# Patient Record
Sex: FEMALE | Race: WHITE | Employment: OTHER | URBAN - METROPOLITAN AREA
[De-identification: names, ages, dates, MRNs, and addresses within clinical notes are randomized per-mention and may not be internally consistent; named-entity substitution may affect disease eponyms.]

---

## 2024-01-30 ENCOUNTER — OFFICE VISIT (OUTPATIENT)
Age: 84
End: 2024-01-30
Payer: MEDICARE

## 2024-01-30 VITALS
SYSTOLIC BLOOD PRESSURE: 131 MMHG | OXYGEN SATURATION: 98 % | BODY MASS INDEX: 23.55 KG/M2 | DIASTOLIC BLOOD PRESSURE: 70 MMHG | HEART RATE: 64 BPM | WEIGHT: 128 LBS | TEMPERATURE: 97.5 F | HEIGHT: 62 IN

## 2024-01-30 DIAGNOSIS — H35.30 MACULAR DEGENERATION, UNSPECIFIED LATERALITY, UNSPECIFIED TYPE: ICD-10-CM

## 2024-01-30 DIAGNOSIS — E53.8 VITAMIN B12 DEFICIENCY: ICD-10-CM

## 2024-01-30 DIAGNOSIS — Z79.890 HORMONE REPLACEMENT THERAPY (POSTMENOPAUSAL): ICD-10-CM

## 2024-01-30 DIAGNOSIS — J30.2 SEASONAL ALLERGIC RHINITIS, UNSPECIFIED TRIGGER: ICD-10-CM

## 2024-01-30 DIAGNOSIS — E55.9 VITAMIN D DEFICIENCY, UNSPECIFIED: ICD-10-CM

## 2024-01-30 DIAGNOSIS — I10 HYPERTENSION, UNSPECIFIED TYPE: Primary | ICD-10-CM

## 2024-01-30 DIAGNOSIS — G47.00 INSOMNIA, UNSPECIFIED TYPE: ICD-10-CM

## 2024-01-30 DIAGNOSIS — K21.9 GASTROESOPHAGEAL REFLUX DISEASE WITHOUT ESOPHAGITIS: ICD-10-CM

## 2024-01-30 DIAGNOSIS — F32.A ANXIETY AND DEPRESSION: ICD-10-CM

## 2024-01-30 DIAGNOSIS — F41.9 ANXIETY AND DEPRESSION: ICD-10-CM

## 2024-01-30 DIAGNOSIS — E78.2 MIXED HYPERLIPIDEMIA: ICD-10-CM

## 2024-01-30 DIAGNOSIS — M15.9 PRIMARY OSTEOARTHRITIS INVOLVING MULTIPLE JOINTS: ICD-10-CM

## 2024-01-30 DIAGNOSIS — J44.9 CHRONIC OBSTRUCTIVE PULMONARY DISEASE, UNSPECIFIED COPD TYPE (HCC): ICD-10-CM

## 2024-01-30 DIAGNOSIS — E03.9 ACQUIRED HYPOTHYROIDISM: ICD-10-CM

## 2024-01-30 DIAGNOSIS — R73.9 HYPERGLYCEMIA: ICD-10-CM

## 2024-01-30 PROBLEM — M15.0 PRIMARY OSTEOARTHRITIS INVOLVING MULTIPLE JOINTS: Status: ACTIVE | Noted: 2024-01-30

## 2024-01-30 LAB
ALBUMIN SERPL-MCNC: 3.7 G/DL (ref 3.5–5)
ALBUMIN/GLOB SERPL: 1.2 (ref 1.1–2.2)
ALP SERPL-CCNC: 63 U/L (ref 45–117)
ALT SERPL-CCNC: 17 U/L (ref 12–78)
ANION GAP SERPL CALC-SCNC: 5 MMOL/L (ref 5–15)
AST SERPL-CCNC: 21 U/L (ref 15–37)
BASOPHILS # BLD: 0.1 K/UL (ref 0–0.1)
BASOPHILS NFR BLD: 1 % (ref 0–1)
BILIRUB SERPL-MCNC: 0.5 MG/DL (ref 0.2–1)
BUN SERPL-MCNC: 21 MG/DL (ref 6–20)
BUN/CREAT SERPL: 29 (ref 12–20)
CALCIUM SERPL-MCNC: 9.1 MG/DL (ref 8.5–10.1)
CHLORIDE SERPL-SCNC: 105 MMOL/L (ref 97–108)
CHOLEST SERPL-MCNC: 153 MG/DL
CO2 SERPL-SCNC: 31 MMOL/L (ref 21–32)
CREAT SERPL-MCNC: 0.72 MG/DL (ref 0.55–1.02)
CREAT UR-MCNC: 111 MG/DL
DIFFERENTIAL METHOD BLD: ABNORMAL
EOSINOPHIL # BLD: 0.8 K/UL (ref 0–0.4)
EOSINOPHIL NFR BLD: 10 % (ref 0–7)
ERYTHROCYTE [DISTWIDTH] IN BLOOD BY AUTOMATED COUNT: 13.4 % (ref 11.5–14.5)
GLOBULIN SER CALC-MCNC: 3.1 G/DL (ref 2–4)
GLUCOSE SERPL-MCNC: 95 MG/DL (ref 65–100)
HCT VFR BLD AUTO: 39.1 % (ref 35–47)
HDLC SERPL-MCNC: 95 MG/DL
HDLC SERPL: 1.6 (ref 0–5)
HGB BLD-MCNC: 12 G/DL (ref 11.5–16)
IMM GRANULOCYTES # BLD AUTO: 0 K/UL (ref 0–0.04)
IMM GRANULOCYTES NFR BLD AUTO: 0 % (ref 0–0.5)
LDLC SERPL CALC-MCNC: 42.8 MG/DL (ref 0–100)
LYMPHOCYTES # BLD: 1.4 K/UL (ref 0.8–3.5)
LYMPHOCYTES NFR BLD: 19 % (ref 12–49)
MCH RBC QN AUTO: 29.2 PG (ref 26–34)
MCHC RBC AUTO-ENTMCNC: 30.7 G/DL (ref 30–36.5)
MCV RBC AUTO: 95.1 FL (ref 80–99)
MICROALBUMIN UR-MCNC: 0.79 MG/DL
MICROALBUMIN/CREAT UR-RTO: 7 MG/G (ref 0–30)
MONOCYTES # BLD: 0.5 K/UL (ref 0–1)
MONOCYTES NFR BLD: 7 % (ref 5–13)
NEUTS SEG # BLD: 4.5 K/UL (ref 1.8–8)
NEUTS SEG NFR BLD: 63 % (ref 32–75)
NRBC # BLD: 0 K/UL (ref 0–0.01)
NRBC BLD-RTO: 0 PER 100 WBC
PLATELET # BLD AUTO: 231 K/UL (ref 150–400)
PMV BLD AUTO: 10.5 FL (ref 8.9–12.9)
POTASSIUM SERPL-SCNC: 4.1 MMOL/L (ref 3.5–5.1)
PROT SERPL-MCNC: 6.8 G/DL (ref 6.4–8.2)
RBC # BLD AUTO: 4.11 M/UL (ref 3.8–5.2)
SODIUM SERPL-SCNC: 141 MMOL/L (ref 136–145)
TRIGL SERPL-MCNC: 76 MG/DL
VLDLC SERPL CALC-MCNC: 15.2 MG/DL
WBC # BLD AUTO: 7.2 K/UL (ref 3.6–11)

## 2024-01-30 PROCEDURE — 99204 OFFICE O/P NEW MOD 45 MIN: CPT | Performed by: INTERNAL MEDICINE

## 2024-01-30 PROCEDURE — 1123F ACP DISCUSS/DSCN MKR DOCD: CPT | Performed by: INTERNAL MEDICINE

## 2024-01-30 PROCEDURE — 3075F SYST BP GE 130 - 139MM HG: CPT | Performed by: INTERNAL MEDICINE

## 2024-01-30 PROCEDURE — 3078F DIAST BP <80 MM HG: CPT | Performed by: INTERNAL MEDICINE

## 2024-01-30 RX ORDER — BUSPIRONE HYDROCHLORIDE 30 MG/1
30 TABLET ORAL 2 TIMES DAILY
COMMUNITY

## 2024-01-30 RX ORDER — LOSARTAN POTASSIUM 100 MG/1
100 TABLET ORAL 2 TIMES DAILY
COMMUNITY

## 2024-01-30 RX ORDER — DULOXETIN HYDROCHLORIDE 60 MG/1
60 CAPSULE, DELAYED RELEASE ORAL DAILY
COMMUNITY

## 2024-01-30 RX ORDER — ATORVASTATIN CALCIUM 80 MG/1
80 TABLET, FILM COATED ORAL DAILY
COMMUNITY

## 2024-01-30 RX ORDER — OMEPRAZOLE 40 MG/1
40 CAPSULE, DELAYED RELEASE ORAL DAILY
COMMUNITY

## 2024-01-30 RX ORDER — CELECOXIB 400 MG/1
400 CAPSULE ORAL
COMMUNITY

## 2024-01-30 RX ORDER — FUROSEMIDE 20 MG/1
20 TABLET ORAL DAILY
COMMUNITY

## 2024-01-30 RX ORDER — AMLODIPINE BESYLATE 10 MG/1
10 TABLET ORAL DAILY
COMMUNITY

## 2024-01-30 RX ORDER — LEVOCETIRIZINE DIHYDROCHLORIDE 5 MG/1
5 TABLET, FILM COATED ORAL NIGHTLY
COMMUNITY

## 2024-01-30 RX ORDER — ASPIRIN 81 MG/1
81 TABLET ORAL DAILY
COMMUNITY

## 2024-01-30 RX ORDER — ZOLPIDEM TARTRATE 12.5 MG/1
12.5 TABLET, FILM COATED, EXTENDED RELEASE ORAL NIGHTLY PRN
COMMUNITY

## 2024-01-30 RX ORDER — TRAZODONE HYDROCHLORIDE 150 MG/1
150 TABLET ORAL NIGHTLY
COMMUNITY

## 2024-01-30 RX ORDER — LEVOTHYROXINE SODIUM 0.05 MG/1
50 TABLET ORAL DAILY
COMMUNITY

## 2024-01-30 RX ORDER — LABETALOL 200 MG/1
200 TABLET, FILM COATED ORAL 2 TIMES DAILY
COMMUNITY

## 2024-01-30 SDOH — ECONOMIC STABILITY: FOOD INSECURITY: WITHIN THE PAST 12 MONTHS, YOU WORRIED THAT YOUR FOOD WOULD RUN OUT BEFORE YOU GOT MONEY TO BUY MORE.: NEVER TRUE

## 2024-01-30 SDOH — ECONOMIC STABILITY: HOUSING INSECURITY
IN THE LAST 12 MONTHS, WAS THERE A TIME WHEN YOU DID NOT HAVE A STEADY PLACE TO SLEEP OR SLEPT IN A SHELTER (INCLUDING NOW)?: NO

## 2024-01-30 SDOH — ECONOMIC STABILITY: FOOD INSECURITY: WITHIN THE PAST 12 MONTHS, THE FOOD YOU BOUGHT JUST DIDN'T LAST AND YOU DIDN'T HAVE MONEY TO GET MORE.: NEVER TRUE

## 2024-01-30 SDOH — ECONOMIC STABILITY: INCOME INSECURITY: HOW HARD IS IT FOR YOU TO PAY FOR THE VERY BASICS LIKE FOOD, HOUSING, MEDICAL CARE, AND HEATING?: NOT HARD AT ALL

## 2024-01-30 ASSESSMENT — PATIENT HEALTH QUESTIONNAIRE - PHQ9
3. TROUBLE FALLING OR STAYING ASLEEP: 0
6. FEELING BAD ABOUT YOURSELF - OR THAT YOU ARE A FAILURE OR HAVE LET YOURSELF OR YOUR FAMILY DOWN: 0
8. MOVING OR SPEAKING SO SLOWLY THAT OTHER PEOPLE COULD HAVE NOTICED. OR THE OPPOSITE, BEING SO FIGETY OR RESTLESS THAT YOU HAVE BEEN MOVING AROUND A LOT MORE THAN USUAL: 1
SUM OF ALL RESPONSES TO PHQ QUESTIONS 1-9: 3
10. IF YOU CHECKED OFF ANY PROBLEMS, HOW DIFFICULT HAVE THESE PROBLEMS MADE IT FOR YOU TO DO YOUR WORK, TAKE CARE OF THINGS AT HOME, OR GET ALONG WITH OTHER PEOPLE: 0
1. LITTLE INTEREST OR PLEASURE IN DOING THINGS: 0
9. THOUGHTS THAT YOU WOULD BE BETTER OFF DEAD, OR OF HURTING YOURSELF: 0
7. TROUBLE CONCENTRATING ON THINGS, SUCH AS READING THE NEWSPAPER OR WATCHING TELEVISION: 1
5. POOR APPETITE OR OVEREATING: 0
2. FEELING DOWN, DEPRESSED OR HOPELESS: 0
4. FEELING TIRED OR HAVING LITTLE ENERGY: 1
SUM OF ALL RESPONSES TO PHQ9 QUESTIONS 1 & 2: 0

## 2024-01-30 NOTE — PROGRESS NOTES
Chief Complaint   Patient presents with    Establish Care     New patient. Just moved to [place] 7-10 days ago from [facility].     Other     Had Stroke 2 weeks ago, given TPA, went to [facility] in [facility]. Requesting referral to neurology.    History of macular degeneration, needs referral for eye specialist.    Joint Pain     C/o Multiple joint pain, worst in hands and she has visable joint deformity of hands, c/o bilateral swelling of feet. 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         1. \"Have you been to the ER, urgent care clinic since your last visit?  Hospitalized since your last visit?\"    yes    2. \"Have you seen or consulted any other health care providers outside of the [facility] since your last visit?\"      yes               1/30/2024     1:58 PM   PHQ-9    Little interest or pleasure in doing things 0   Feeling down, depressed, or hopeless 0   Trouble falling or staying asleep, or sleeping too much 0   Feeling tired or having little energy 1   Poor appetite or overeating 0   Feeling bad about yourself - or that you are a failure or have let yourself or your family down 0   Trouble concentrating on things, such as reading the newspaper or watching television 1   Moving or speaking so slowly that other people could have noticed. Or the opposite - being so fidgety or restless that you have been moving around a lot more than usual 1   Thoughts that you would be better off dead, or of hurting yourself in some way 0   PHQ-2 Score 0   PHQ-9 Total Score 3   If you checked off any problems, how difficult have these problems made it for you to do your work, take care of things at home, or get along with other people? 0           Financial Resource Strain: Low Risk  (1/30/2024)    Overall Financial Resource Strain (CARDIA)     Difficulty of Paying Living Expenses: Not hard at all      Food Insecurity: No Food Insecurity (1/30/2024)    Hunger Vital Sign     Worried About Running Out of

## 2024-01-30 NOTE — PATIENT INSTRUCTIONS
List of Mental Health Providers:    [facility]  [address]  [phone]  ---------------------------------------------  [facility]  [address]  [phone]  ---------------------------------------------  [facility]  [address]  [phone]  ---------------------------------------------  [address]  [phone]  ---------------------------------------------  [facility]  [address], [address]  [phone]    Or    [address] [place] [address] 101  [phone]  ---------------------------------------------  Brett Anderson MD  [phone]  ---------------------------------------------  [facility]  [phone]  --------------------------------------------  [facility] [place] [address], [address] [address] [phone]  [place]  --------------------------------------------  [facility]  [address], [address] [place] [address] [phone]

## 2024-01-31 LAB
25(OH)D3 SERPL-MCNC: 57.8 NG/ML (ref 30–100)
EST. AVERAGE GLUCOSE BLD GHB EST-MCNC: 94 MG/DL
FOLATE SERPL-MCNC: 13.7 NG/ML (ref 5–21)
HBA1C MFR BLD: 4.9 % (ref 4–5.6)
VIT B12 SERPL-MCNC: >2000 PG/ML (ref 193–986)

## 2024-02-01 ASSESSMENT — ENCOUNTER SYMPTOMS
CHEST TIGHTNESS: 0
COLOR CHANGE: 0
VOMITING: 0
SORE THROAT: 0
SHORTNESS OF BREATH: 0
RHINORRHEA: 0
WHEEZING: 0
NAUSEA: 0
FACIAL SWELLING: 0
COUGH: 0
ABDOMINAL PAIN: 0

## 2024-02-02 LAB — TSH SERPL DL<=0.05 MIU/L-ACNC: 2.6 UIU/ML (ref 0.45–4.5)

## 2024-02-12 ENCOUNTER — TELEPHONE (OUTPATIENT)
Age: 84
End: 2024-02-12

## 2024-02-12 DIAGNOSIS — Z86.73 HISTORY OF CVA (CEREBROVASCULAR ACCIDENT): Primary | ICD-10-CM

## 2024-02-12 NOTE — TELEPHONE ENCOUNTER
Patient called stating that she had an understanding that Dr. Mcguire would write her a referral to a urologist. Patient is hoping that this can be done as well as a call back when this has been completed.    Best call back number  [phone]

## 2024-02-14 RX ORDER — ATORVASTATIN CALCIUM 80 MG/1
80 TABLET, FILM COATED ORAL NIGHTLY
Qty: 90 TABLET | Refills: 1 | Status: SHIPPED | OUTPATIENT
Start: 2024-02-14 | End: 2024-02-15

## 2024-02-14 NOTE — TELEPHONE ENCOUNTER
Last appointment: 1/30/24 Shayla, lipid completed 1/2024  Next appointment: 3/13/24 Shayla  Previous refill encounter(s): historical provider- New patient    Requested Prescriptions     Pending Prescriptions Disp Refills    atorvastatin (LIPITOR) 80 MG tablet [Pharmacy Med Name: Atorvastatin Calcium Oral Tablet 80 MG] 90 tablet 1     Sig: TAKE ONE TABLET BY MOUTH AT BEDTIME     For Pharmacy Admin Tracking Only    Program: Medication Refill  CPA in place:    Recommendation Provided To:   Intervention Detail: New Rx: 1, reason: Patient Preference  Intervention Accepted By:   Gap Closed?:    Time Spent (min): 5

## 2024-02-15 ENCOUNTER — TELEPHONE (OUTPATIENT)
Age: 84
End: 2024-02-15

## 2024-02-15 ENCOUNTER — OFFICE VISIT (OUTPATIENT)
Age: 84
End: 2024-02-15
Payer: MEDICARE

## 2024-02-15 VITALS
BODY MASS INDEX: 24.28 KG/M2 | OXYGEN SATURATION: 98 % | RESPIRATION RATE: 17 BRPM | SYSTOLIC BLOOD PRESSURE: 110 MMHG | WEIGHT: 128.6 LBS | HEIGHT: 61 IN | HEART RATE: 64 BPM | DIASTOLIC BLOOD PRESSURE: 60 MMHG

## 2024-02-15 DIAGNOSIS — R00.2 PALPITATIONS: Primary | ICD-10-CM

## 2024-02-15 DIAGNOSIS — I48.0 PAROXYSMAL ATRIAL FIBRILLATION (HCC): ICD-10-CM

## 2024-02-15 DIAGNOSIS — I10 PRIMARY HYPERTENSION: ICD-10-CM

## 2024-02-15 DIAGNOSIS — E03.9 ACQUIRED HYPOTHYROIDISM: ICD-10-CM

## 2024-02-15 DIAGNOSIS — K21.9 GASTROESOPHAGEAL REFLUX DISEASE WITHOUT ESOPHAGITIS: ICD-10-CM

## 2024-02-15 DIAGNOSIS — I67.1 INTERNAL CAROTID ANEURYSM: ICD-10-CM

## 2024-02-15 PROCEDURE — 1123F ACP DISCUSS/DSCN MKR DOCD: CPT | Performed by: INTERNAL MEDICINE

## 2024-02-15 PROCEDURE — 3078F DIAST BP <80 MM HG: CPT | Performed by: INTERNAL MEDICINE

## 2024-02-15 PROCEDURE — 93005 ELECTROCARDIOGRAM TRACING: CPT | Performed by: INTERNAL MEDICINE

## 2024-02-15 PROCEDURE — 3074F SYST BP LT 130 MM HG: CPT | Performed by: INTERNAL MEDICINE

## 2024-02-15 PROCEDURE — 99204 OFFICE O/P NEW MOD 45 MIN: CPT | Performed by: INTERNAL MEDICINE

## 2024-02-15 PROCEDURE — 93010 ELECTROCARDIOGRAM REPORT: CPT | Performed by: INTERNAL MEDICINE

## 2024-02-15 RX ORDER — ATORVASTATIN CALCIUM 80 MG/1
80 TABLET, FILM COATED ORAL NIGHTLY
Qty: 90 TABLET | Refills: 1 | Status: SHIPPED | OUTPATIENT
Start: 2024-02-15

## 2024-02-15 RX ORDER — ATORVASTATIN CALCIUM 80 MG/1
80 TABLET, FILM COATED ORAL NIGHTLY
Qty: 90 TABLET | Refills: 1 | Status: SHIPPED | OUTPATIENT
Start: 2024-02-15 | End: 2024-02-15 | Stop reason: SDUPTHER

## 2024-02-15 RX ORDER — FUROSEMIDE 20 MG/1
20 TABLET ORAL DAILY
Qty: 90 TABLET | Refills: 0 | Status: SHIPPED | OUTPATIENT
Start: 2024-02-15 | End: 2024-02-15 | Stop reason: SDUPTHER

## 2024-02-15 RX ORDER — FUROSEMIDE 20 MG/1
20 TABLET ORAL DAILY
Qty: 90 TABLET | Refills: 0 | Status: SHIPPED | OUTPATIENT
Start: 2024-02-15 | End: 2024-05-15

## 2024-02-15 RX ORDER — MV-MIN/FA/VIT K/LUTEIN/ZEAXANT 200MCG-5MG
1 CAPSULE ORAL DAILY
Qty: 30 CAPSULE | Refills: 3 | OUTPATIENT
Start: 2024-02-15

## 2024-02-15 NOTE — TELEPHONE ENCOUNTER
Enrolled with [employer] - Ordered and being shipped to patient's home address on file.  ETA within 5-7 business days.    14 Day Holter  Received: Today  Marti Ta, Carmen Schneider :)    Would you please order a 14 day for this patient?      Thank youuu!      Marti

## 2024-02-15 NOTE — TELEPHONE ENCOUNTER
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    [phone]

## 2024-02-15 NOTE — PROGRESS NOTES
Cardiology  Clinic -   Follow up Visit   Date of Service : 2/15/2024    Name: Rajani Barboza  Patient ID: [id number]  Age: 83 y.o. 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  Need to rule out cardioembolic causes.  Plan to do a Holter monitor for 14 days to rule out atrial fibrillation.  I agree with continuing aspirin 81 mg daily  Patient also has bilateral pedal edema worsening which could be secondary to her acute on chronic diastolic heart failure and amlodipine side effect I advised to increase the dose of Lasix to 40 mg once daily for 5 days and go to 20 mg once daily thereafter  Hypertension fairly well-controlled on amlodipine 10 mg once daily along with labetalol 200 mg twice daily along with losartan 1 tablet twice a day  Advise strict salt and fluid restriction  Advised to follow-up with PCP for management of hypothyroidism and COPD  Patient also has fusiform dilatation of both cavernous segments of left internal carotid artery advised to follow-up with neurology  Hyperlipidemia fairly well-controlled on Lipitor 80 mg once daily and fish oil  Patient advised aggressive risk factor and lifestyle modification- diet and exercise counseling done   patient advised strict compliance with medication and follow-up   handouts were given to patient along with  trustworthy

## 2024-02-16 NOTE — TELEPHONE ENCOUNTER
Requesting referral for Neurology, not urology. For PMH of stroke. Please put in referral, gave her office info for Dr. Bartlett.

## 2024-02-22 ENCOUNTER — OFFICE VISIT (OUTPATIENT)
Age: 84
End: 2024-02-22
Payer: MEDICARE

## 2024-02-22 VITALS
BODY MASS INDEX: 22.45 KG/M2 | HEART RATE: 57 BPM | OXYGEN SATURATION: 99 % | SYSTOLIC BLOOD PRESSURE: 122 MMHG | WEIGHT: 122 LBS | DIASTOLIC BLOOD PRESSURE: 58 MMHG | HEIGHT: 62 IN

## 2024-02-22 DIAGNOSIS — Z86.73 REMOTE HISTORY OF STROKE: Primary | ICD-10-CM

## 2024-02-22 DIAGNOSIS — Z86.73 REMOTE HISTORY OF STROKE: ICD-10-CM

## 2024-02-22 DIAGNOSIS — I10 HYPERTENSION, UNSPECIFIED TYPE: ICD-10-CM

## 2024-02-22 DIAGNOSIS — E78.5 HYPERLIPIDEMIA, UNSPECIFIED HYPERLIPIDEMIA TYPE: ICD-10-CM

## 2024-02-22 PROCEDURE — 1123F ACP DISCUSS/DSCN MKR DOCD: CPT | Performed by: PSYCHIATRY & NEUROLOGY

## 2024-02-22 PROCEDURE — 3078F DIAST BP <80 MM HG: CPT | Performed by: PSYCHIATRY & NEUROLOGY

## 2024-02-22 PROCEDURE — 3074F SYST BP LT 130 MM HG: CPT | Performed by: PSYCHIATRY & NEUROLOGY

## 2024-02-22 PROCEDURE — 99205 OFFICE O/P NEW HI 60 MIN: CPT | Performed by: PSYCHIATRY & NEUROLOGY

## 2024-02-22 RX ORDER — CLOPIDOGREL BISULFATE 75 MG/1
75 TABLET ORAL DAILY
Qty: 60 TABLET | Refills: 0 | Status: SHIPPED | OUTPATIENT
Start: 2024-02-22 | End: 2024-02-23 | Stop reason: SDUPTHER

## 2024-02-22 RX ORDER — CLOPIDOGREL BISULFATE 75 MG/1
75 TABLET ORAL DAILY
Qty: 60 TABLET | Refills: 0 | Status: SHIPPED | OUTPATIENT
Start: 2024-02-22 | End: 2024-02-22 | Stop reason: SDUPTHER

## 2024-02-22 NOTE — PROGRESS NOTES
NEUROLOGY  NEW PATIENT EVALUATION/CONSULTATION       PATIENT NAME: Rajani Barboza    MRN: [id number]    REASON FOR CONSULTATION: Stroke    02/22/24      Previous records (physician notes, laboratory reports, and radiology reports) and imaging studies were reviewed and summarized. My recommendations will be communicated back to the patient's physician(s) via electronic medical record and/or by US mail.       HISTORY OF PRESENT ILLNESS:  Rajani Barboza is a 83 y.o. right handed female presenting for evaluation of stroke. She has relocated recently from [place] and is referred by her PCP due to h/o stroke 1/15/24 with acute L-HP, speech deficits s/p tPA. I unfortunately do not have records for review today. Pt/family reports she improved significant thereafter. Etiology is uncertain. She is undergoing cardiac monitoring currently. No known h/o Afib. She was taking ASA prior to her recent stroke. Upon discharge, statin therapy was started for HPL and ASA was continued. She admits to vascular risk factors including HTN, HPL. Other PMH of WARNER, hypothyroidism, COPD, macular degeneration, OA. She denies h/o stroke apart from events on 1/15/24. No recurrent stroke deficits since her hospitalization. She has experienced some periodic lightheadedness since discharge along with residual word finding difficulty. Denies residual focal weakness, numbness/tingling. Reports baseline gait instability, requiring a cane for assistance. She is living with her son and two grandchildren.     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

## 2024-02-23 DIAGNOSIS — Z86.73 REMOTE HISTORY OF STROKE: ICD-10-CM

## 2024-02-23 RX ORDER — CLOPIDOGREL BISULFATE 75 MG/1
75 TABLET ORAL DAILY
Qty: 60 TABLET | Refills: 0 | OUTPATIENT
Start: 2024-02-23

## 2024-02-26 RX ORDER — DULOXETIN HYDROCHLORIDE 60 MG/1
60 CAPSULE, DELAYED RELEASE ORAL DAILY
Qty: 90 CAPSULE | Refills: 1 | Status: SHIPPED | OUTPATIENT
Start: 2024-02-26

## 2024-02-26 RX ORDER — LOSARTAN POTASSIUM 100 MG/1
100 TABLET ORAL DAILY
Qty: 90 TABLET | Refills: 0 | Status: SHIPPED | OUTPATIENT
Start: 2024-02-26

## 2024-02-26 NOTE — TELEPHONE ENCOUNTER
Patient calling for refills of losartan 100mg BID and duloxetine 60mg QD to [facility].  Thanks, Michelle    Last appointment: 1/30/24 MD Mcguire  Next appointment: 3/13/24 Shayla  Previous refill encounter(s): historical provider    Requested Prescriptions     Pending Prescriptions Disp Refills    DULoxetine (CYMBALTA) 60 MG extended release capsule 90 capsule 1     Sig: Take 1 capsule by mouth daily    losartan (COZAAR) 100 MG tablet 180 tablet 0     Sig: Take 1 tablet by mouth 2 times daily     For Pharmacy Admin Tracking Only    Program: Medication Refill  CPA in place:    Recommendation Provided To:   Intervention Detail: New Rx: 2, reason: Patient Preference  Intervention Accepted By:   Gap Closed?:    Time Spent (min): 5

## 2024-03-04 RX ORDER — TRAZODONE HYDROCHLORIDE 150 MG/1
150 TABLET ORAL NIGHTLY
Qty: 90 TABLET | Refills: 0 | Status: SHIPPED | OUTPATIENT
Start: 2024-03-04

## 2024-03-04 RX ORDER — DULOXETIN HYDROCHLORIDE 60 MG/1
60 CAPSULE, DELAYED RELEASE ORAL DAILY
Qty: 90 CAPSULE | Refills: 0 | Status: SHIPPED | OUTPATIENT
Start: 2024-03-04

## 2024-03-04 RX ORDER — LOSARTAN POTASSIUM 100 MG/1
100 TABLET ORAL DAILY
Qty: 90 TABLET | Refills: 0 | Status: SHIPPED | OUTPATIENT
Start: 2024-03-04 | End: 2024-03-22 | Stop reason: SDUPTHER

## 2024-03-04 NOTE — TELEPHONE ENCOUNTER
Patient called hoping to have her medication Losartan and Duloxetine sent to the [facility] instead of [facility]. Patient no longer gets her medication from [facility], and is hoping to have this fixed. I have deleted the [facility] pharmacy out of her chart per patient request. Patient is hoping to get this medication switched over today, as she is completely out of this medication.    Best call back number  [phone]

## 2024-03-10 SDOH — HEALTH STABILITY: PHYSICAL HEALTH: ON AVERAGE, HOW MANY DAYS PER WEEK DO YOU ENGAGE IN MODERATE TO STRENUOUS EXERCISE (LIKE A BRISK WALK)?: 0 DAYS

## 2024-03-10 ASSESSMENT — PATIENT HEALTH QUESTIONNAIRE - PHQ9
2. FEELING DOWN, DEPRESSED OR HOPELESS: 1
SUM OF ALL RESPONSES TO PHQ QUESTIONS 1-9: 1
SUM OF ALL RESPONSES TO PHQ9 QUESTIONS 1 & 2: 1
SUM OF ALL RESPONSES TO PHQ QUESTIONS 1-9: 1
SUM OF ALL RESPONSES TO PHQ QUESTIONS 1-9: 1
1. LITTLE INTEREST OR PLEASURE IN DOING THINGS: 0
SUM OF ALL RESPONSES TO PHQ QUESTIONS 1-9: 1

## 2024-03-10 ASSESSMENT — LIFESTYLE VARIABLES
HOW OFTEN DO YOU HAVE SIX OR MORE DRINKS ON ONE OCCASION: 1
HOW OFTEN DO YOU HAVE A DRINK CONTAINING ALCOHOL: 2-3 TIMES A WEEK
HOW MANY STANDARD DRINKS CONTAINING ALCOHOL DO YOU HAVE ON A TYPICAL DAY: 1
HOW MANY STANDARD DRINKS CONTAINING ALCOHOL DO YOU HAVE ON A TYPICAL DAY: 1 OR 2
HOW OFTEN DO YOU HAVE A DRINK CONTAINING ALCOHOL: 4

## 2024-03-12 ENCOUNTER — TELEPHONE (OUTPATIENT)
Age: 84
End: 2024-03-12

## 2024-03-12 NOTE — TELEPHONE ENCOUNTER
Patient returned a call to the device clinic, also inform patient of message on [employer], would like a call back.       Pt# [phone]

## 2024-03-12 NOTE — TELEPHONE ENCOUNTER
Received a call from [employer] that pts 14 day holter was returned with no data.  Order has been cxl & pt wont be charged.  They can not tell if pt didn't wear it or if there was a problem with the monitor.  It will need to be repeated.

## 2024-03-12 NOTE — TELEPHONE ENCOUNTER
Called patient and left voicemail to call our office back. Also sent [employer] message to patient.

## 2024-03-13 ENCOUNTER — OFFICE VISIT (OUTPATIENT)
Age: 84
End: 2024-03-13
Payer: MEDICARE

## 2024-03-13 VITALS
OXYGEN SATURATION: 98 % | BODY MASS INDEX: 23.37 KG/M2 | SYSTOLIC BLOOD PRESSURE: 117 MMHG | WEIGHT: 127 LBS | HEIGHT: 62 IN | TEMPERATURE: 98.1 F | DIASTOLIC BLOOD PRESSURE: 58 MMHG | HEART RATE: 64 BPM

## 2024-03-13 DIAGNOSIS — Z23 NEED FOR PNEUMOCOCCAL VACCINATION: ICD-10-CM

## 2024-03-13 DIAGNOSIS — Z00.00 MEDICARE ANNUAL WELLNESS VISIT, SUBSEQUENT: Primary | ICD-10-CM

## 2024-03-13 DIAGNOSIS — M54.40 BILATERAL LOW BACK PAIN WITH SCIATICA, SCIATICA LATERALITY UNSPECIFIED, UNSPECIFIED CHRONICITY: ICD-10-CM

## 2024-03-13 PROCEDURE — 3074F SYST BP LT 130 MM HG: CPT | Performed by: INTERNAL MEDICINE

## 2024-03-13 PROCEDURE — 1123F ACP DISCUSS/DSCN MKR DOCD: CPT | Performed by: INTERNAL MEDICINE

## 2024-03-13 PROCEDURE — 3078F DIAST BP <80 MM HG: CPT | Performed by: INTERNAL MEDICINE

## 2024-03-13 PROCEDURE — 90677 PCV20 VACCINE IM: CPT | Performed by: INTERNAL MEDICINE

## 2024-03-13 PROCEDURE — PBSHW PNEUMOCOCCAL, PCV20, PREVNAR 20, (AGE 6W+), IM, PF: Performed by: INTERNAL MEDICINE

## 2024-03-13 PROCEDURE — G0439 PPPS, SUBSEQ VISIT: HCPCS | Performed by: INTERNAL MEDICINE

## 2024-03-13 RX ORDER — FUROSEMIDE 20 MG/1
40 TABLET ORAL DAILY
COMMUNITY

## 2024-03-13 ASSESSMENT — PATIENT HEALTH QUESTIONNAIRE - PHQ9
1. LITTLE INTEREST OR PLEASURE IN DOING THINGS: 0
SUM OF ALL RESPONSES TO PHQ QUESTIONS 1-9: 1
4. FEELING TIRED OR HAVING LITTLE ENERGY: 0
SUM OF ALL RESPONSES TO PHQ9 QUESTIONS 1 & 2: 1
5. POOR APPETITE OR OVEREATING: 0
10. IF YOU CHECKED OFF ANY PROBLEMS, HOW DIFFICULT HAVE THESE PROBLEMS MADE IT FOR YOU TO DO YOUR WORK, TAKE CARE OF THINGS AT HOME, OR GET ALONG WITH OTHER PEOPLE: 1
9. THOUGHTS THAT YOU WOULD BE BETTER OFF DEAD, OR OF HURTING YOURSELF: 0
8. MOVING OR SPEAKING SO SLOWLY THAT OTHER PEOPLE COULD HAVE NOTICED. OR THE OPPOSITE, BEING SO FIGETY OR RESTLESS THAT YOU HAVE BEEN MOVING AROUND A LOT MORE THAN USUAL: 0
7. TROUBLE CONCENTRATING ON THINGS, SUCH AS READING THE NEWSPAPER OR WATCHING TELEVISION: 0
6. FEELING BAD ABOUT YOURSELF - OR THAT YOU ARE A FAILURE OR HAVE LET YOURSELF OR YOUR FAMILY DOWN: 0
2. FEELING DOWN, DEPRESSED OR HOPELESS: 1
SUM OF ALL RESPONSES TO PHQ QUESTIONS 1-9: 1
3. TROUBLE FALLING OR STAYING ASLEEP: 0

## 2024-03-13 NOTE — PATIENT INSTRUCTIONS
aging. It also can happen when you are exposed long-term to loud noise. Examples include listening to loud music, riding motorcycles, or being around other loud machines.  Hearing loss can affect your work and home life. It can make you feel lonely or depressed. You may feel that you have lost your independence. But hearing aids and other devices can help you hear better and feel connected to others.  Follow-up care is a key part of your treatment and safety. Be sure to make and go to all appointments, and call your doctor if you are having problems. It's also a good idea to know your test results and keep a list of the medicines you take.  How can you care for yourself at home?  Avoid loud noises whenever possible. This helps keep your hearing from getting worse.  Always wear hearing protection around loud noises.  Wear a hearing aid as directed.  A professional can help you pick a hearing aid that will work best for you.  You can also get hearing aids over the counter for mild to moderate hearing loss.  Have hearing tests as your doctor suggests. They can show whether your hearing has changed. Your hearing aid may need to be adjusted.  Use other devices as needed. These may include:  Telephone amplifiers and hearing aids that can connect to a television, stereo, radio, or microphone.  Devices that use lights or vibrations. These alert you to the doorbell, a ringing telephone, or a baby monitor.  Television closed-captioning. This shows the words at the bottom of the screen. Most new TVs can do this.  TTY (text telephone). This lets you type messages back and forth on the telephone instead of talking or listening. These devices are also called TDD. When messages are typed on the keyboard, they are sent over the phone line to a receiving TTY. The message is shown on a monitor.  Use text messaging, social media, and email if it is hard for you to communicate by telephone.  Try to learn a listening technique called

## 2024-03-14 ASSESSMENT — ENCOUNTER SYMPTOMS
RHINORRHEA: 0
SORE THROAT: 0
SHORTNESS OF BREATH: 0
NAUSEA: 0
WHEEZING: 0
COUGH: 0
FACIAL SWELLING: 0
ABDOMINAL PAIN: 0
CHEST TIGHTNESS: 0
VOMITING: 0
COLOR CHANGE: 0

## 2024-03-15 ENCOUNTER — ANCILLARY PROCEDURE (OUTPATIENT)
Age: 84
End: 2024-03-15
Payer: MEDICARE

## 2024-03-15 PROCEDURE — 93246 EXT ECG>7D<15D RECORDING: CPT | Performed by: INTERNAL MEDICINE

## 2024-03-19 DIAGNOSIS — F51.01 PRIMARY INSOMNIA: Primary | ICD-10-CM

## 2024-03-19 NOTE — TELEPHONE ENCOUNTER
Patient called stating that they are needing a medication refill on their medication Zolpidem.    Best call back number  [phone]

## 2024-03-20 RX ORDER — ZOLPIDEM TARTRATE 12.5 MG/1
12.5 TABLET, FILM COATED, EXTENDED RELEASE ORAL NIGHTLY PRN
OUTPATIENT
Start: 2024-03-20

## 2024-03-21 RX ORDER — ZOLPIDEM TARTRATE 12.5 MG/1
12.5 TABLET, FILM COATED, EXTENDED RELEASE ORAL NIGHTLY PRN
Qty: 30 TABLET | Refills: 0 | Status: SHIPPED | OUTPATIENT
Start: 2024-03-21 | End: 2024-04-20

## 2024-03-22 ENCOUNTER — TELEPHONE (OUTPATIENT)
Age: 84
End: 2024-03-22

## 2024-03-22 DIAGNOSIS — I10 PRIMARY HYPERTENSION: Primary | ICD-10-CM

## 2024-03-22 RX ORDER — LOSARTAN POTASSIUM 100 MG/1
100 TABLET ORAL DAILY
Qty: 90 TABLET | Refills: 0 | Status: SHIPPED | OUTPATIENT
Start: 2024-03-22

## 2024-03-22 NOTE — TELEPHONE ENCOUNTER
Per VO of MD    LOV: 2/15/2024     Future Appointments   Date Time Provider Department Center   5/22/2024 10:40 AM Kyle Zapata MD [facility]   9/6/2024 10:00 AM Love Bartlett DO [facility]   9/16/2024  3:00 PM Concha Mcguire MD [facility]       Requested Prescriptions     Signed Prescriptions Disp Refills    losartan (COZAAR) 100 MG tablet 90 tablet 0     Sig: Take 1 tablet by mouth daily     Authorizing Provider: KYLE ZAPATA     Ordering User: GARETH SCHOFIELD

## 2024-04-08 PROCEDURE — 93248 EXT ECG>7D<15D REV&INTERPJ: CPT | Performed by: INTERNAL MEDICINE

## 2024-04-08 RX ORDER — FUROSEMIDE 20 MG/1
20 TABLET ORAL DAILY
Qty: 90 TABLET | Refills: 1 | Status: SHIPPED | OUTPATIENT
Start: 2024-04-08 | End: 2024-04-12 | Stop reason: SDUPTHER

## 2024-04-08 NOTE — TELEPHONE ENCOUNTER
Per VO of MD    LOV: 2/15/2024     Future Appointments   Date Time Provider Department Center   5/22/2024 10:40 AM Kyle Zapata MD [facility]   9/6/2024 10:00 AM Love Bartlett DO [facility]   9/16/2024  3:00 PM Concha Mcguire MD [facility]       Requested Prescriptions     Signed Prescriptions Disp Refills    furosemide (LASIX) 20 MG tablet 90 tablet 1     Sig: TAKE 1 TABLET BY MOUTH ONE TIME DAILY     Authorizing Provider: KYLE ZAPATA     Ordering User: GARETH SCHOFIELD

## 2024-04-10 ENCOUNTER — TELEPHONE (OUTPATIENT)
Age: 84
End: 2024-04-10

## 2024-04-10 NOTE — TELEPHONE ENCOUNTER
Telephone call made to patient. Two patient identifiers verified. Went over results with patient. Verified understanding. All questions answered.        ----- Message from Bahman Zapata MD sent at 4/8/2024 11:54 PM EDT -----  Holter monitor did not show any evidence of atrial fibrillation but there were few episodes of atrial tachycardia with variable block nothing to worry will continue to monitor continue same treatment

## 2024-04-12 NOTE — TELEPHONE ENCOUNTER
Patient is requesting a new Rx for Lasix 40mg. Rx pended with updated dose. Please sign if appropriate.    Last OV notes:  furosemide (LASIX) 20 MG tablet (Taking) Take 2 tablets by mouth daily     Last appointment: 3/13/24  Next appointment: 9/16/24    Requested Prescriptions     Pending Prescriptions Disp Refills    furosemide (LASIX) 40 MG tablet 90 tablet 1     Sig: Take 1 tablet by mouth daily         For Pharmacy Admin Tracking Only    Program: Medication Refill  CPA in place:    Recommendation Provided To:   Intervention Detail: New Rx: 1, reason: Patient Preference  Intervention Accepted By:   Gap Closed?:    Time Spent (min): 5

## 2024-04-14 RX ORDER — FUROSEMIDE 40 MG/1
40 TABLET ORAL DAILY
Qty: 90 TABLET | Refills: 0 | Status: SHIPPED | OUTPATIENT
Start: 2024-04-14

## 2024-04-18 ENCOUNTER — TELEPHONE (OUTPATIENT)
Age: 84
End: 2024-04-18

## 2024-04-18 NOTE — TELEPHONE ENCOUNTER
Patient called stating that she is needing a refill for Losartan medication. She is also hoping to have instructions state one tablet twice a day. Patient is hoping to have this completed today if possible, as she has been tying to get this refilled for a while through the pharmacy.    Best call back number  [phone]

## 2024-04-19 ENCOUNTER — TELEPHONE (OUTPATIENT)
Age: 84
End: 2024-04-19

## 2024-04-19 DIAGNOSIS — I10 PRIMARY HYPERTENSION: Primary | ICD-10-CM

## 2024-04-19 DIAGNOSIS — I10 PRIMARY HYPERTENSION: ICD-10-CM

## 2024-04-19 RX ORDER — AMLODIPINE BESYLATE 5 MG/1
5 TABLET ORAL DAILY
Qty: 90 TABLET | Refills: 1 | Status: SHIPPED | OUTPATIENT
Start: 2024-04-19

## 2024-04-19 RX ORDER — LOSARTAN POTASSIUM 100 MG/1
100 TABLET ORAL DAILY
Qty: 90 TABLET | Refills: 3 | Status: SHIPPED | OUTPATIENT
Start: 2024-04-19

## 2024-04-19 NOTE — TELEPHONE ENCOUNTER
Per VO of MD    LOV: 2/15/2024     Future Appointments   Date Time Provider Department Center   5/22/2024 10:40 AM Kyle Zapata MD [facility]   9/6/2024 10:00 AM Love Bartlett DO [facility]   9/16/2024  3:00 PM Concha Mcguire MD [facility]       Requested Prescriptions     Signed Prescriptions Disp Refills    losartan (COZAAR) 100 MG tablet 90 tablet 3     Sig: Take 1 tablet by mouth daily     Authorizing Provider: KYLE ZAPATA     Ordering User: GARETH SCHOFIELD

## 2024-04-19 NOTE — TELEPHONE ENCOUNTER
Telephone call made to patient. Two patient identifiers verified. Reviewed medication changes with patient per Dr. Zapata; patient verbalized understanding. She was already taking amlodipine 10 mg; I advised her to use up the rest of those pills by cutting them in half since recommended dose in now 5 mg. I let her know I sent in prescription refills for the Losartan and Amlodipine. Patient verbalized understanding.                       Bahman Zapata MD Short, Mina, MA; Marti Ta, RN  Thanks for the update  I agree  aMrti  Please let the patient know to reduce the losartan to 100 mg po daily and add amlodipine 5 mg po daily          Previous Messages       ----- Message -----  From: Ruben Monahan MA  Sent: 4/19/2024  11:21 AM EDT  To: Bahman Zapata MD  Subject: Re: Uledi patient                              -FYI-    Dear Dr. Zapata,    Ms. Barboza was taking 200mg of losartan daily, that is how her cardiologist in [place] was prescribing it for her, per records and pill bottle she brought to new patient appointment with Dr. Mcguire on 1/30/24. She was advised that Dr. Mcguire could not prescribe losartan for her at that level, the max she can do is 100mg daily, patient was encouraged to discuss this when she establishes with a cardiologist locally as she wishes to continue 200mg daily.    Patient reached out again yesterday to our office about getting a refill of Losartan 100mg BID and was this morning again advised we can not fill at that dose. She may be reaching out to your office soon.    Best Regards,  Ruben (nurse for Dr. Mcguire)

## 2024-04-19 NOTE — TELEPHONE ENCOUNTER
Patient requested a 90 day refill on losartan 100mg, patient out of medication.      [facility] [phone]

## 2024-04-19 NOTE — TELEPHONE ENCOUNTER
S/w patient, she has established with a cardiologist. Recommended patient consult them to see if they are okay with her continuing Losartan at 200mg a day as her previous cardiologist in [place] was prescribing it. Patient was previously advised at her new patient appointment and with last refill request.     Re-affirmed that Dr. Mcguire cannot prescribe it for her at that dose as it above current medical guidelines for max daily dose, to which patient said \"I guess I will have to talk to them (cardiology) then, that is very unhelpful\" and hung up on me.

## 2024-05-22 ENCOUNTER — OFFICE VISIT (OUTPATIENT)
Age: 84
End: 2024-05-22
Payer: MEDICARE

## 2024-05-22 VITALS
DIASTOLIC BLOOD PRESSURE: 48 MMHG | HEART RATE: 72 BPM | OXYGEN SATURATION: 97 % | BODY MASS INDEX: 23.26 KG/M2 | SYSTOLIC BLOOD PRESSURE: 98 MMHG | WEIGHT: 126.4 LBS | HEIGHT: 62 IN

## 2024-05-22 DIAGNOSIS — I50.32 CHRONIC DIASTOLIC CONGESTIVE HEART FAILURE (HCC): Primary | ICD-10-CM

## 2024-05-22 DIAGNOSIS — I10 PRIMARY HYPERTENSION: ICD-10-CM

## 2024-05-22 PROCEDURE — 99214 OFFICE O/P EST MOD 30 MIN: CPT | Performed by: INTERNAL MEDICINE

## 2024-05-22 ASSESSMENT — PATIENT HEALTH QUESTIONNAIRE - PHQ9
SUM OF ALL RESPONSES TO PHQ QUESTIONS 1-9: 0
SUM OF ALL RESPONSES TO PHQ QUESTIONS 1-9: 0
1. LITTLE INTEREST OR PLEASURE IN DOING THINGS: NOT AT ALL
SUM OF ALL RESPONSES TO PHQ QUESTIONS 1-9: 0
SUM OF ALL RESPONSES TO PHQ9 QUESTIONS 1 & 2: 0
SUM OF ALL RESPONSES TO PHQ QUESTIONS 1-9: 0
2. FEELING DOWN, DEPRESSED OR HOPELESS: NOT AT ALL

## 2024-05-22 NOTE — PROGRESS NOTES
Oil-Cholecalciferol (OMEGA-3 + VITAMIN D3 PO) Take by mouth 2 times daily    amLODIPine (NORVASC) 10 MG tablet Take 1 tablet by mouth daily     No current facility-administered medications for this visit.       I have reviewed the nurses notes, vitals, problem list, allergy list, medical history, family, social history and medications.       REVIEW OF SYMPTOMS      Please see detailed HPI above, pertinent ROS and negatives noted     PHYSICAL EXAMINATION      BP (!) 98/48 (Site: Left Upper Arm, Position: Sitting, Cuff Size: Medium Adult)   Pulse 72   Ht 1.562 m (5' 1.5\")   Wt 57.3 kg (126 lb 6.4 oz)   SpO2 97%   BMI 23.50 kg/m²       Physical Exam  HENT:      Head: Normocephalic.      Nose: Nose normal.      Mouth/Throat:      Mouth: Mucous membranes are moist.   Eyes:      Conjunctiva/sclera: Conjunctivae normal.   Cardiovascular:      Rate and Rhythm: Normal rate and regular rhythm.      Pulses: Normal pulses.      Heart sounds: Normal heart sounds.   Pulmonary:      Effort: Pulmonary effort is normal.      Breath sounds: Normal breath sounds.   Musculoskeletal:         General: Normal range of motion.      Cervical back: Normal range of motion.   Skin:     General: Skin is warm.   Neurological:      General: No focal deficit present.      Mental Status: She is alert and oriented to person, place, and time.      Comments: Blurring of speech   Psychiatric:         Mood and Affect: Mood normal.               CARDIAC STUDIES            2/15/2024  EKG: Normal sinus rhythm with T wave abnormality     Echo done on January 15, 2024 outside hospital  Showed normal left ventricular systolic function with ejection fraction of 60 with mild LVH with diastolic dysfunction with moderate pulmonary hypertension with mildly dilated right atrium left atrium       Holter monitor done on 4/8/2024  1.  No definite evidence of atrial fibrillation  2.  Occasional supraventricular tachycardia most likely atrial tachycardia with

## 2024-05-28 ENCOUNTER — TELEPHONE (OUTPATIENT)
Age: 84
End: 2024-05-28

## 2024-05-28 NOTE — TELEPHONE ENCOUNTER
Patient called and stated that she need a 90 day supply of Duloxetine 60mg sent to [facility] on [address]      Call back [phone]

## 2024-05-29 RX ORDER — DULOXETIN HYDROCHLORIDE 60 MG/1
CAPSULE, DELAYED RELEASE ORAL
Qty: 90 CAPSULE | Refills: 1 | Status: SHIPPED | OUTPATIENT
Start: 2024-05-29

## 2024-07-17 RX ORDER — FUROSEMIDE 40 MG/1
40 TABLET ORAL DAILY
Qty: 90 TABLET | Refills: 0 | Status: SHIPPED | OUTPATIENT
Start: 2024-07-17

## 2024-07-17 NOTE — TELEPHONE ENCOUNTER
Last appointment: 3/13/24 MD Mcguire  Next appointment: 9/16/24 Shayla  Previous refill encounter(s): 4/14/24 90    Requested Prescriptions     Pending Prescriptions Disp Refills    furosemide (LASIX) 40 MG tablet [Pharmacy Med Name: Furosemide Oral Tablet 40 MG] 90 tablet 1     Sig: Take 1 tablet by mouth daily     For Pharmacy Admin Tracking Only    Program: Medication Refill  CPA in place:    Recommendation Provided To:   Intervention Detail: New Rx: 1, reason: Patient Preference  Intervention Accepted By:   Gap Closed?:    Time Spent (min): 5

## 2024-08-05 RX ORDER — TRAZODONE HYDROCHLORIDE 150 MG/1
150 TABLET ORAL NIGHTLY
Qty: 90 TABLET | Refills: 0 | Status: SHIPPED | OUTPATIENT
Start: 2024-08-05

## 2024-08-05 RX ORDER — ATORVASTATIN CALCIUM 80 MG/1
80 TABLET, FILM COATED ORAL NIGHTLY
Qty: 90 TABLET | Refills: 3 | Status: SHIPPED | OUTPATIENT
Start: 2024-08-05

## 2024-08-05 NOTE — TELEPHONE ENCOUNTER
Per VO of MD    LOV: 5/22/2024     Future Appointments   Date Time Provider Department Center   9/6/2024 10:00 AM Love Bartlett DO [facility]   9/16/2024  3:00 PM Concha Mcguire MD [facility]   12/12/2024  2:00 PM Kyle Zapata, MD [facility]       Requested Prescriptions     Signed Prescriptions Disp Refills    atorvastatin (LIPITOR) 80 MG tablet 90 tablet 3     Sig: TAKE 1 TABLET BY MOUTH NIGHTLY.     Authorizing Provider: KYLE ZAPATA     Ordering User: GARETH SCHOFIELD

## 2024-08-05 NOTE — TELEPHONE ENCOUNTER
PCP: Concha Mcguire MD      Future Appointments   Date Time Provider Department Center   9/6/2024 10:00 AM Love Bartlett DO [facility]   9/16/2024  3:00 PM Concha Mcguire MD [facility]   12/12/2024  2:00 PM Bahman Zapata MD [facility]       Requested Prescriptions     Pending Prescriptions Disp Refills    traZODone (DESYREL) 150 MG tablet [Pharmacy Med Name: traZODone HCl Oral Tablet 150 MG] 90 tablet 0     Sig: take 1 tablet by mouth nightly       Prior labs and Blood pressures:  BP Readings from Last 3 Encounters:   05/22/24 (!) 98/48   03/13/24 (!) 117/58   02/22/24 (!) 122/58     Lab Results   Component Value Date/Time     01/30/2024 02:51 PM    K 4.1 01/30/2024 02:51 PM     01/30/2024 02:51 PM    CO2 31 01/30/2024 02:51 PM    BUN 21 01/30/2024 02:51 PM     No results found for: \"HBA1C\", \"[id number]\"  Lab Results   Component Value Date/Time    CHOL 153 01/30/2024 02:51 PM    HDL 95 01/30/2024 02:51 PM    LDL 42.8 01/30/2024 02:51 PM    VLDL 15.2 01/30/2024 02:51 PM     No results found for: \"VITD3\"    Lab Results   Component Value Date/Time    TSH 2.600 01/30/2024 02:51 PM

## 2024-09-03 ENCOUNTER — TELEPHONE (OUTPATIENT)
Age: 84
End: 2024-09-03

## 2024-09-03 DIAGNOSIS — I67.1 CEREBRAL ANEURYSM: Primary | ICD-10-CM

## 2024-09-03 NOTE — TELEPHONE ENCOUNTER
----- Message from Dr. Love Bartlett DO sent at 9/3/2024  3:22 PM EDT -----  Outside hospital records partially received-CTA with evidence of fusiform aneurysmal dilatation of b/l carotid arteries. Advise repeat CTA head for re-evaluation and possible [facility] referral pending these results. RODRIGUEZ

## 2024-09-03 NOTE — TELEPHONE ENCOUNTER
Called and spoke with the Pt. Per Dr. Bartlett:    Outside hospital records partially received-CTA with evidence of fusiform aneurysmal dilatation of b/l carotid arteries. Advise repeat CTA head for re-evaluation and possible [facility] referral pending these results.

## 2024-09-06 ENCOUNTER — OFFICE VISIT (OUTPATIENT)
Age: 84
End: 2024-09-06

## 2024-09-06 VITALS
HEIGHT: 62 IN | BODY MASS INDEX: 22.08 KG/M2 | WEIGHT: 120 LBS | OXYGEN SATURATION: 99 % | DIASTOLIC BLOOD PRESSURE: 62 MMHG | HEART RATE: 61 BPM | SYSTOLIC BLOOD PRESSURE: 128 MMHG

## 2024-09-06 DIAGNOSIS — E78.5 HYPERLIPIDEMIA, UNSPECIFIED HYPERLIPIDEMIA TYPE: ICD-10-CM

## 2024-09-06 DIAGNOSIS — R47.89 WORD FINDING DIFFICULTY: ICD-10-CM

## 2024-09-06 DIAGNOSIS — Z86.73 REMOTE HISTORY OF STROKE: Primary | ICD-10-CM

## 2024-09-06 DIAGNOSIS — I10 HYPERTENSION, UNSPECIFIED TYPE: ICD-10-CM

## 2024-09-06 DIAGNOSIS — R47.89 OTHER SPEECH DISTURBANCE: ICD-10-CM

## 2024-09-06 RX ORDER — ALBUTEROL SULFATE 90 UG/1
AEROSOL, METERED RESPIRATORY (INHALATION) AS NEEDED
COMMUNITY
Start: 2024-03-07

## 2024-09-06 RX ORDER — CYCLOSPORINE 0.5 MG/ML
EMULSION OPHTHALMIC 2 TIMES DAILY
COMMUNITY
Start: 2024-09-04

## 2024-09-06 ASSESSMENT — PATIENT HEALTH QUESTIONNAIRE - PHQ9
2. FEELING DOWN, DEPRESSED OR HOPELESS: SEVERAL DAYS
SUM OF ALL RESPONSES TO PHQ QUESTIONS 1-9: 1
SUM OF ALL RESPONSES TO PHQ QUESTIONS 1-9: 1
1. LITTLE INTEREST OR PLEASURE IN DOING THINGS: NOT AT ALL
SUM OF ALL RESPONSES TO PHQ QUESTIONS 1-9: 1
SUM OF ALL RESPONSES TO PHQ QUESTIONS 1-9: 1
SUM OF ALL RESPONSES TO PHQ9 QUESTIONS 1 & 2: 1

## 2024-09-06 NOTE — PROGRESS NOTES
Neurology Clinic Follow up Note    Patient ID:  Rajani Braboza  [id number]  83 y.o.  1940      Ms. Barboza is here for follow up today of  Chief Complaint   Patient presents with    Follow-up     Hx of stroke          Last Appointment With Me:  2/22/2024    \"Rajani Barboza is a 83 y.o. right handed female presenting for evaluation of stroke. She has relocated recently from [place] and is referred by her PCP due to h/o stroke 1/15/24 with acute L-HP, speech deficits s/p tPA. I unfortunately do not have records for review today. Pt/family reports she improved significant thereafter. Etiology is uncertain. She is undergoing cardiac monitoring currently. No known h/o Afib. She was taking ASA prior to her recent stroke. Upon discharge, statin therapy was started for HPL and ASA was continued. She admits to vascular risk factors including HTN, HPL. Other PMH of WARNER, hypothyroidism, COPD, macular degeneration, OA. She denies h/o stroke apart from events on 1/15/24. No recurrent stroke deficits since her hospitalization. She has experienced some periodic lightheadedness since discharge along with residual word finding difficulty. Denies residual focal weakness, numbness/tingling. Reports baseline gait instability, requiring a cane for assistance. She is living with her son and two grandchildren.\"   Interval History:   She is having some difficulty with word finding/aphasia, no dysarthria. She feels this came on more gradually over the past few months. Also reports some issues with memory. Symptoms are rather constant. Denies new weakness/numbness or vision loss. She is using a cane to assist with ambulation. Suffered a fall 1 month ago without significant injury.   She is compliant with ASA/statin therapy for stroke prevention-tolerating medication well.   BP appears well controlled.   She has completed extended cardiac monitoring x 14 days, no Afib noted. Cardiology recommended continuation of ASA.   She is now living

## 2024-09-12 ENCOUNTER — HOSPITAL ENCOUNTER (OUTPATIENT)
Age: 84
Discharge: HOME | End: 2024-09-15
Payer: MEDICARE

## 2024-09-12 DIAGNOSIS — I67.1 CEREBRAL ANEURYSM: ICD-10-CM

## 2024-09-12 PROCEDURE — 70496 CT ANGIOGRAPHY HEAD: CPT

## 2024-09-12 PROCEDURE — 6360000004 HC RX CONTRAST MEDICATION: Performed by: RADIOLOGY

## 2024-09-12 RX ORDER — IOPAMIDOL 755 MG/ML
100 INJECTION, SOLUTION INTRAVASCULAR
Status: COMPLETED | OUTPATIENT
Start: 2024-09-12 | End: 2024-09-12

## 2024-09-12 RX ADMIN — IOPAMIDOL 100 ML: 755 INJECTION, SOLUTION INTRAVENOUS at 13:41

## 2024-09-17 ENCOUNTER — HOSPITAL ENCOUNTER (OUTPATIENT)
Facility: HOSPITAL | Age: 84
Discharge: HOME | End: 2024-09-20
Attending: PSYCHIATRY & NEUROLOGY
Payer: MEDICARE

## 2024-09-17 DIAGNOSIS — Z86.73 REMOTE HISTORY OF STROKE: ICD-10-CM

## 2024-09-17 DIAGNOSIS — R47.89 WORD FINDING DIFFICULTY: ICD-10-CM

## 2024-09-17 DIAGNOSIS — R47.89 OTHER SPEECH DISTURBANCE: ICD-10-CM

## 2024-09-17 PROCEDURE — 70551 MRI BRAIN STEM W/O DYE: CPT

## 2024-09-18 DIAGNOSIS — I67.1 CEREBRAL ANEURYSM: Primary | ICD-10-CM

## 2024-09-20 ENCOUNTER — TELEPHONE (OUTPATIENT)
Age: 84
End: 2024-09-20

## 2024-10-01 ENCOUNTER — OFFICE VISIT (OUTPATIENT)
Age: 84
End: 2024-10-01
Payer: MEDICARE

## 2024-10-01 VITALS
OXYGEN SATURATION: 99 % | WEIGHT: 124.2 LBS | SYSTOLIC BLOOD PRESSURE: 110 MMHG | DIASTOLIC BLOOD PRESSURE: 58 MMHG | TEMPERATURE: 97.9 F | HEART RATE: 65 BPM | HEIGHT: 61 IN | BODY MASS INDEX: 23.45 KG/M2

## 2024-10-01 DIAGNOSIS — I67.1 CEREBRAL ANEURYSM: Primary | ICD-10-CM

## 2024-10-01 PROCEDURE — 1123F ACP DISCUSS/DSCN MKR DOCD: CPT | Performed by: PSYCHIATRY & NEUROLOGY

## 2024-10-01 PROCEDURE — 3078F DIAST BP <80 MM HG: CPT | Performed by: PSYCHIATRY & NEUROLOGY

## 2024-10-01 PROCEDURE — 3074F SYST BP LT 130 MM HG: CPT | Performed by: PSYCHIATRY & NEUROLOGY

## 2024-10-01 PROCEDURE — 99213 OFFICE O/P EST LOW 20 MIN: CPT | Performed by: PSYCHIATRY & NEUROLOGY

## 2024-10-01 NOTE — PROGRESS NOTES
New Patient Visit         CONSULT INFORMATION:  Date of Service: 10/1/2024  Consultation Requested by: neuro    PATIENT IDENTIFICATION:  Patient Name: Rajani Barboza  : 1940      CC: aneurysm    HISTORY OF PRESENT ILLNESS:  83 y.o. LH White (non-) [1]female referred for aneurysms. Pt had a stroke in [place] earlier this year. Presented with stroke like sx and was treated with lytics. MRI was neg. She has made a full recovery. CTA was done and repeated recently which show 12mm b/l cavernous ICA fusiform aneurysms. She denies HA or diplopia. No fam hx of SAH.        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     Acquired hypothyroidism 2024    Anxiety and depression 2024    COPD (chronic obstructive pulmonary disease) (HCC)     Gastroesophageal reflux disease without esophagitis 2024    Hearing loss     Hormone replacement therapy (postmenopausal) 2024    Hypertension 2024    Insomnia 2024    Macular degeneration     Memory disorder     Mixed hyperlipidemia 2024    Osteoarthritis     Seasonal allergic rhinitis 2024    Stroke (HCC) 01/15/2024       Past Surgical History:   Procedure Laterality Date    ANKLE FRACTURE SURGERY Left 2012    Hardware placed (plate)    DILATION AND CURETTAGE OF UTERUS      x2    HAND SURGERY      Pins placed in right hand to help with OA/ pain    HYSTERECTOMY, TOTAL ABDOMINAL (CERVIX REMOVED)      TONSILLECTOMY         Current Outpatient Medications   Medication Sig    albuterol sulfate HFA (PROVENTIL;VENTOLIN;PROAIR) 108 (90 Base) MCG/ACT inhaler as needed    RESTASIS 0.05 % ophthalmic emulsion in the morning and at bedtime    Melatonin-Pyridoxine (MELATIN PO) Take by mouth at bedtime    traZODone (DESYREL) 150 MG tablet take 1 tablet by mouth nightly (Patient taking differently: Take 0.5 tablets by mouth nightly Takes half of pill once a day.)    atorvastatin (LIPITOR) 80 MG

## 2024-10-01 NOTE — PROGRESS NOTES
New patient referred by Dr Bartlett presenting with Cerebral aneurysm.  Son at visit.  Patient reports continued occasional headaches, light headedness, and memory issues.  Patient recently moved from [place] to [place] to be closer to family.  No new problems reported.

## 2025-01-24 ENCOUNTER — OFFICE VISIT (OUTPATIENT)
Age: 85
End: 2025-01-24
Payer: MEDICARE

## 2025-01-24 VITALS
HEIGHT: 61 IN | HEART RATE: 65 BPM | DIASTOLIC BLOOD PRESSURE: 60 MMHG | SYSTOLIC BLOOD PRESSURE: 118 MMHG | BODY MASS INDEX: 22.84 KG/M2 | WEIGHT: 121 LBS | OXYGEN SATURATION: 95 %

## 2025-01-24 DIAGNOSIS — E78.2 MIXED HYPERLIPIDEMIA: Primary | ICD-10-CM

## 2025-01-24 DIAGNOSIS — I50.33 ACUTE ON CHRONIC DIASTOLIC HEART FAILURE (HCC): ICD-10-CM

## 2025-01-24 PROBLEM — I50.30 DIASTOLIC HEART FAILURE (HCC): Status: ACTIVE | Noted: 2025-01-24

## 2025-01-24 PROCEDURE — 1123F ACP DISCUSS/DSCN MKR DOCD: CPT | Performed by: INTERNAL MEDICINE

## 2025-01-24 PROCEDURE — 1126F AMNT PAIN NOTED NONE PRSNT: CPT | Performed by: INTERNAL MEDICINE

## 2025-01-24 PROCEDURE — 99214 OFFICE O/P EST MOD 30 MIN: CPT | Performed by: INTERNAL MEDICINE

## 2025-01-24 PROCEDURE — 3074F SYST BP LT 130 MM HG: CPT | Performed by: INTERNAL MEDICINE

## 2025-01-24 PROCEDURE — 3078F DIAST BP <80 MM HG: CPT | Performed by: INTERNAL MEDICINE

## 2025-01-24 PROCEDURE — 1159F MED LIST DOCD IN RCRD: CPT | Performed by: INTERNAL MEDICINE

## 2025-01-24 RX ORDER — AMLODIPINE BESYLATE 5 MG/1
5 TABLET ORAL DAILY
Qty: 90 TABLET | Refills: 3 | Status: SHIPPED | COMMUNITY
Start: 2025-01-24

## 2025-01-24 RX ORDER — ZOLPIDEM TARTRATE 12.5 MG/1
TABLET, FILM COATED, EXTENDED RELEASE ORAL
COMMUNITY
Start: 2025-01-22

## 2025-01-24 ASSESSMENT — PATIENT HEALTH QUESTIONNAIRE - PHQ9
SUM OF ALL RESPONSES TO PHQ QUESTIONS 1-9: 0
SUM OF ALL RESPONSES TO PHQ9 QUESTIONS 1 & 2: 0
SUM OF ALL RESPONSES TO PHQ QUESTIONS 1-9: 0
SUM OF ALL RESPONSES TO PHQ QUESTIONS 1-9: 0
2. FEELING DOWN, DEPRESSED OR HOPELESS: NOT AT ALL
SUM OF ALL RESPONSES TO PHQ QUESTIONS 1-9: 0
1. LITTLE INTEREST OR PLEASURE IN DOING THINGS: NOT AT ALL

## 2025-01-24 NOTE — PROGRESS NOTES
153 01/30/2024    TRIG 76 01/30/2024    HDL 95 01/30/2024    VLDL 15.2 01/30/2024    CHOLHDLRATIO 1.6 01/30/2024        Thank you,  Concha Mcguire MD for involving me in the care of  Rajani Barboza. Please do not hesitate to contact me for further questions/concerns.       Patient Care Team:  Concha Mcguire MD as PCP - General (Family Medicine)  Concha Mcguire MD as PCP - Empaneled Provider    [facility] [place]  [facility] [address], [address]  [place], [place] [address]  Fax : [phone]     [facility] -- Cardiology, [place]  [address]  [address]  [place] [address]  Fax: [phone]    [facility]  Call center: (P) [phone]  (F) [phone]    The patient (or guardian, if applicable) and other individuals in attendance with the patient were advised that Artificial Intelligence will be utilized during this visit to record and process the conversation to generate a clinical note. The patient (or guardian, if applicable) and other individuals in attendance at the appointment consented to the use of AI, including the recording.       Voice - recognition dictation software was used in  the generation of this note.  Errors may exist. if there is any potential confusion or discrepancy, please feel free to contact me for clarification

## 2025-01-24 NOTE — PATIENT INSTRUCTIONS
Suma Gerard,    Thank you for visiting my office today. Your commitment to maintaining your health and addressing your concerns is highly valued.    Following our consultation, here are the key instructions and recommendations for your care plan:    Medications:    Continue taking Losartan 100 mg once daily and Labetalol 200 mg twice daily.    Adjust Amlodipine to 5 mg once daily. A prescription for a 3-month supply of Amlodipine 5 mg has been sent to [facility].    Take Lasix as needed on days when you experience shortness of breath.    Continue taking your prescribed aspirin and cholesterol medication.    Lifestyle and Activity:    Incorporate walking into your routine to help manage blood pressure and support cognitive health.    Monitoring and Follow-Up:    Keep a medical diary to track your health and medications.    Bring your medication list or pictures of your medications to all doctor visits.    Follow up with your neurologist for cognitive testing as discussed.    Schedule a follow-up appointment with me in 7 months.    Please call my office on Monday to confirm the exact dose of Amlodipine you are currently taking. Your health and well-being are our top priority. Remember to focus on your well-being and do not hesitate to reach out if you have any questions or concerns.    Best regards,    Dr. Bahman Zapata MD  Cardiology   Consent: The patient's consent was obtained including but not limited to risks of crusting, scabbing, blistering, scarring, darker or lighter pigmentary change, recurrence, incomplete removal and infection. Post-Care Instructions: I reviewed with the patient in detail post-care instructions. Patient is to wear sunprotection, and avoid picking at any of the treated lesions. Pt may apply Vaseline to crusted or scabbing areas. Render Post-Care Instructions In Note?: no Detail Level: Zone Number Of Freeze-Thaw Cycles: 2 freeze-thaw cycles Duration Of Freeze Thaw-Cycle (Seconds): 3

## 2025-01-28 ENCOUNTER — TELEPHONE (OUTPATIENT)
Age: 85
End: 2025-01-28

## 2025-01-28 RX ORDER — AMLODIPINE BESYLATE 5 MG/1
5 TABLET ORAL DAILY
Qty: 90 TABLET | Refills: 3 | Status: SHIPPED | OUTPATIENT
Start: 2025-01-28 | End: 2025-01-28

## 2025-01-28 RX ORDER — AMLODIPINE BESYLATE 5 MG/1
5 TABLET ORAL DAILY
Qty: 30 TABLET | Refills: 11 | Status: SHIPPED | OUTPATIENT
Start: 2025-01-28

## 2025-01-28 NOTE — TELEPHONE ENCOUNTER
Patient is calling because the doctor was suppose to have called a prescription into the pharmacy for Amlodipine 5 mg for 30 day supply.    The pharmacy doesn't have a prescription for her.Please assist.       Pharmacy:  [id number] - [place], [place] - [address] -  [phone] - F [phone] patient

## 2025-01-28 NOTE — TELEPHONE ENCOUNTER
Per VO of MD    LOV: 5/22/2024     Future Appointments   Date Time Provider Department Center   4/4/2025  9:00 AM Love Bartlett DO [facility] AMB   8/29/2025 11:40 AM Bahman Lou MD [facility] AMB       Requested Prescriptions     Signed Prescriptions Disp Refills    amLODIPine (NORVASC) 5 MG tablet 90 tablet 3     Sig: Take 1 tablet by mouth daily     Authorizing Provider: BAHMAN LOU     Ordering User: GARETH SCHOFIELD

## 2025-02-11 ENCOUNTER — TELEPHONE (OUTPATIENT)
Age: 85
End: 2025-02-11

## 2025-02-11 NOTE — TELEPHONE ENCOUNTER
HIPAA Verified (if caller is someone other than patient): yes       Reason for Call:     If calling to cancel, does patient want to reschedule?   N/A    Is this call related to results?   N/A    If yes, please let patient know they must wait for their follow up / feedback appointment to discuss.  They can, however,  a copy of the results at the clinic 2-3 weeks after their testing appt.     Is this a New Patient Appointment Request?   yes    If yes:   Requested Provider (choose all that apply - patient doesn't need to call ALL locations):   Yareli     Referred By:  Dr. Bartlett    Referral in chart?   yes    Patient's Insurance Carrier (please ensure you gather insurance information):   Yes    Additional notes / reason for call:   Patient son stated he is requesting full evaluation concerning mother's Memory loss and other cognitive issues not just word finding difficulty/other speech disturbance.      **Please advise callers that it could take up to 5 business days for a return call**        Message: (any additional details from patient/caller not covered above)  N/A        Level 1 Calls - attempted to reach practice? N/A     Reason Call Marked High Priority (if applicable):   N/A

## 2025-03-31 DIAGNOSIS — I10 PRIMARY HYPERTENSION: ICD-10-CM

## 2025-03-31 RX ORDER — LOSARTAN POTASSIUM 100 MG/1
100 TABLET ORAL DAILY
Qty: 90 TABLET | Refills: 1 | Status: SHIPPED | OUTPATIENT
Start: 2025-03-31

## 2025-03-31 NOTE — TELEPHONE ENCOUNTER
Per VO of MD    LOV: 1/24/2025     Future Appointments   Date Time Provider Department Center   4/4/2025  9:00 AM Love Bartlett DO [facility] AM[facility]   8/13/2025 11:00 AM Jenise Downs PSYD [facility] BS AMB   8/27/2025 11:30 AM NEUROPSYCH TESTING [facility] BS AMB   8/29/2025 11:40 AM Kyle Zapata MD [facility] BS AMB   9/10/2025  1:00 PM Jenise Downs PSYD [facility] BS AMB       Requested Prescriptions     Signed Prescriptions Disp Refills    losartan (COZAAR) 100 MG tablet 90 tablet 1     Sig: TAKE 1 TABLET BY MOUTH ONE TIME DAILY     Authorizing Provider: KYLE ZAPATA     Ordering User: GARETH SCHOFIELD

## 2025-04-04 ENCOUNTER — OFFICE VISIT (OUTPATIENT)
Age: 85
End: 2025-04-04

## 2025-04-04 VITALS
BODY MASS INDEX: 20.38 KG/M2 | HEART RATE: 66 BPM | OXYGEN SATURATION: 96 % | DIASTOLIC BLOOD PRESSURE: 60 MMHG | SYSTOLIC BLOOD PRESSURE: 122 MMHG | HEIGHT: 63 IN | WEIGHT: 115 LBS

## 2025-04-04 DIAGNOSIS — Z86.73 REMOTE HISTORY OF STROKE: Primary | ICD-10-CM

## 2025-04-04 DIAGNOSIS — E78.5 HYPERLIPIDEMIA, UNSPECIFIED HYPERLIPIDEMIA TYPE: ICD-10-CM

## 2025-04-04 DIAGNOSIS — I10 HYPERTENSION, UNSPECIFIED TYPE: ICD-10-CM

## 2025-04-04 RX ORDER — HYDROXYZINE HYDROCHLORIDE 50 MG/1
50 TABLET, FILM COATED ORAL NIGHTLY
COMMUNITY

## 2025-04-04 RX ORDER — MIRTAZAPINE 15 MG/1
15 TABLET, FILM COATED ORAL NIGHTLY
COMMUNITY

## 2025-04-04 NOTE — PROGRESS NOTES
Neurology Clinic Follow up Note    Patient ID:  Rajani Barboza  [id number]  84 y.o.  1940      Ms. Barboza is here for follow up today of  No chief complaint on file.         Last Appointment With Me:  9/6/2024    \"Rajani Barboza is presenting for evaluation of stroke. She has relocated recently from [place] and is referred by her PCP due to h/o stroke 1/15/24 with acute L-HP, speech deficits s/p tPA. I unfortunately do not have records for review today. Pt/family reports she improved significant thereafter. Etiology is uncertain. She is undergoing cardiac monitoring currently. No known h/o Afib. She was taking ASA prior to her recent stroke. Upon discharge, statin therapy was started for HPL and ASA was continued. She admits to vascular risk factors including HTN, HPL. Other PMH of WARNER, hypothyroidism, COPD, macular degeneration, OA. She denies h/o stroke apart from events on 1/15/24. No recurrent stroke deficits since her hospitalization. She has experienced some periodic lightheadedness since discharge along with residual word finding difficulty. Denies residual focal weakness, numbness/tingling. Reports baseline gait instability, requiring a cane for assistance. She is living with her son and two grandchildren.\"   Interval History:   She is compliant with ASA/statin therapy for stroke prevention-tolerating medication well. No new focal neurologic deficits reported since her last visit.   She reports cognitive symptoms are relatively unchanged. Still exhibiting some word finding difficulty. Has not seen ST and neuropsychologic testing is pending-scheduled 8/2025.     PMHx/ PSHx/ FHx/ SHx:  Reviewed and unchanged previous visit.   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